# Patient Record
Sex: MALE | Race: WHITE | NOT HISPANIC OR LATINO | Employment: PART TIME | RURAL
[De-identification: names, ages, dates, MRNs, and addresses within clinical notes are randomized per-mention and may not be internally consistent; named-entity substitution may affect disease eponyms.]

---

## 2020-04-22 ENCOUNTER — HISTORICAL (OUTPATIENT)
Dept: ADMINISTRATIVE | Facility: HOSPITAL | Age: 40
End: 2020-04-22

## 2020-04-23 LAB

## 2021-10-01 ENCOUNTER — IMMUNIZATION (OUTPATIENT)
Dept: FAMILY MEDICINE | Facility: CLINIC | Age: 41
End: 2021-10-01

## 2021-10-01 DIAGNOSIS — Z23 NEED FOR VACCINATION: Primary | ICD-10-CM

## 2021-10-01 PROCEDURE — 91301 COVID-19, MRNA, LNP-S, PF, 100 MCG/0.5 ML DOSE VACCINE: ICD-10-PCS | Mod: ,,, | Performed by: NURSE PRACTITIONER

## 2021-10-01 PROCEDURE — 0012A COVID-19, MRNA, LNP-S, PF, 100 MCG/0.5 ML DOSE VACCINE: CPT | Mod: CV19,,, | Performed by: NURSE PRACTITIONER

## 2021-10-01 PROCEDURE — 0012A COVID-19, MRNA, LNP-S, PF, 100 MCG/0.5 ML DOSE VACCINE: ICD-10-PCS | Mod: CV19,,, | Performed by: NURSE PRACTITIONER

## 2021-10-01 PROCEDURE — 91301 COVID-19, MRNA, LNP-S, PF, 100 MCG/0.5 ML DOSE VACCINE: CPT | Mod: ,,, | Performed by: NURSE PRACTITIONER

## 2023-04-07 ENCOUNTER — OFFICE VISIT (OUTPATIENT)
Dept: FAMILY MEDICINE | Facility: CLINIC | Age: 43
End: 2023-04-07

## 2023-04-07 VITALS
OXYGEN SATURATION: 98 % | BODY MASS INDEX: 27.74 KG/M2 | DIASTOLIC BLOOD PRESSURE: 87 MMHG | HEART RATE: 87 BPM | TEMPERATURE: 99 F | SYSTOLIC BLOOD PRESSURE: 126 MMHG | HEIGHT: 68 IN | WEIGHT: 183 LBS | RESPIRATION RATE: 18 BRPM

## 2023-04-07 DIAGNOSIS — N45.3 EPIDIDYMO-ORCHITIS, ACUTE: Primary | ICD-10-CM

## 2023-04-07 DIAGNOSIS — R93.89 ABNORMAL ULTRASOUND: ICD-10-CM

## 2023-04-07 PROBLEM — F32.A DEPRESSIVE DISORDER: Status: ACTIVE | Noted: 2021-08-16

## 2023-04-07 PROBLEM — F41.9 ANXIETY: Status: ACTIVE | Noted: 2021-08-16

## 2023-04-07 PROCEDURE — 87591 CHLAMYDIA/GONORRHOEAE(GC), PCR: ICD-10-PCS | Mod: ,,, | Performed by: CLINICAL MEDICAL LABORATORY

## 2023-04-07 PROCEDURE — 87491 CHLAMYDIA/GONORRHOEAE(GC), PCR: ICD-10-PCS | Mod: ,,, | Performed by: CLINICAL MEDICAL LABORATORY

## 2023-04-07 PROCEDURE — 87591 N.GONORRHOEAE DNA AMP PROB: CPT | Mod: ,,, | Performed by: CLINICAL MEDICAL LABORATORY

## 2023-04-07 PROCEDURE — 87491 CHLMYD TRACH DNA AMP PROBE: CPT | Mod: ,,, | Performed by: CLINICAL MEDICAL LABORATORY

## 2023-04-07 PROCEDURE — 96372 THER/PROPH/DIAG INJ SC/IM: CPT | Mod: ,,, | Performed by: NURSE PRACTITIONER

## 2023-04-07 PROCEDURE — 99203 PR OFFICE/OUTPT VISIT, NEW, LEVL III, 30-44 MIN: ICD-10-PCS | Mod: 25,,, | Performed by: NURSE PRACTITIONER

## 2023-04-07 PROCEDURE — 99203 OFFICE O/P NEW LOW 30 MIN: CPT | Mod: 25,,, | Performed by: NURSE PRACTITIONER

## 2023-04-07 PROCEDURE — 96372 PR INJECTION,THERAP/PROPH/DIAG2ST, IM OR SUBCUT: ICD-10-PCS | Mod: ,,, | Performed by: NURSE PRACTITIONER

## 2023-04-07 RX ORDER — DOXYCYCLINE 100 MG/1
100 CAPSULE ORAL 2 TIMES DAILY
Qty: 20 CAPSULE | Refills: 0 | Status: SHIPPED | OUTPATIENT
Start: 2023-04-07 | End: 2023-04-17

## 2023-04-07 RX ORDER — CEFTRIAXONE 1 G/1
1 INJECTION, POWDER, FOR SOLUTION INTRAMUSCULAR; INTRAVENOUS
Status: COMPLETED | OUTPATIENT
Start: 2023-04-07 | End: 2023-04-07

## 2023-04-07 RX ADMIN — CEFTRIAXONE 1 G: 1 INJECTION, POWDER, FOR SOLUTION INTRAMUSCULAR; INTRAVENOUS at 10:04

## 2023-04-07 NOTE — PATIENT INSTRUCTIONS
Elevate testicles often to help with pain and swelling  Cool moist compress often for pain  May take tylenol or ibuprofen as needed  Reviewed testicular exam monthly on date of birth each month  Rocephin 1 gm IM   Doxycycline 100 mg one pill by mouth twice a day for 10 days  Complete oral antibiotic  Avoid sexual intercourse when taking the prescribed medication and avoid sexual intercourse until both partners have completed medication therapy  Be sure your sex partner has been notified and seeks treatment  It is recommended to be re-screened in 3-6 months to ensure you have not been re-infected/exposed and all infection is clear  Always use protection during sexual activity to avoid exposure to infections/bacteria and to reduce risk of pregnancy if you are female  The health department will be notified if your test is positive for chlamydia, gonorrhea, or certain other sexual transmitted infections

## 2023-04-07 NOTE — PROGRESS NOTES
RONDA Allen    [address] [place] [address]     PATIENT NAME: Antwon Saldana  : 1980  DATE: 23  MRN: [id number]      Billing Provider: RONDA Allen  Level of Service: [place] OFFICE/OUTPT VISIT, NGUYEN TEJADA III, 30-44 MIN      Assessment and Plan (including Health Maintenance)     Health Maintenance Due   Topic Date Due    Hepatitis C Screening  Never done    Lipid Panel  Never done    Pneumococcal Vaccines (Age 0-64) (1 - PCV) Never done    HIV Screening  Never done    TETANUS VACCINE  Never done    Hemoglobin A1c (Diabetic Prevention Screening)  Never done    COVID-19 Vaccine (3 - Booster for Moderna series) 2021    Influenza Vaccine (1) Never done       Problem List Items Addressed This Visit          Renal/    Epididymo-orchitis, acute - Primary    Current Assessment & Plan     Elevate testicles often to help with pain and swelling  Cool moist compress often for pain  May take tylenol or ibuprofen as needed  Reviewed testicular exam monthly on date of birth each month  Rocephin 1 gm IM   Doxycycline 100 mg one pill by mouth twice a day for 10 days  Complete oral antibiotic  Avoid sexual intercourse when taking the prescribed medication and avoid sexual intercourse until both partners have completed medication therapy  Be sure your sex partner has been notified and seeks treatment  It is recommended to be re-screened in 3-6 months to ensure you have not been re-infected/exposed and all infection is clear  Always use protection during sexual activity to avoid exposure to infections/bacteria and to reduce risk of pregnancy if you are female  The health department will be notified if your test is positive for chlamydia, gonorrhea, or certain other sexual transmitted infections             Relevant Medications    cefTRIAXone injection 1 g (Completed)    doxycycline (VIBRAMYCIN) 100 MG Cap    Other Relevant Orders    Chlamydia/GC, PCR     US Scrotum And Testicles       The patient has no Health Maintenance topics of status Not Due    No future appointments.     Follow up in about 3 months (around 7/7/2023), or if symptoms worsen or fail to improve.       Reason for Visit / Chief Complaint:   Groin Swelling (Pt says his right testicle is swollen.  He says swelling has been present for approximately a week.  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 He says there is a knot in front of the testicle)     History of Present Illness / Problem Focused Workflow     Antwon Saldana presents to the clinic with Groin Swelling (Pt says his right testicle is swollen.  He says swelling has been present for approximately a week.  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 He says there is a knot in front of the testicle)     Right testicle swelling with tenderness and aching started 3/29/2023 at end of work day  Denies injury, drainage  Hx: swelling in the past but resolved with oral medications      Review of Systems     Review of Systems   Respiratory: Negative.     Cardiovascular: Negative.    Genitourinary:  Positive for scrotal swelling and testicular pain. Negative for decreased urine volume, discharge, erectile dysfunction, frequency, genital sores, hematuria, penile pain, penile swelling and urgency.      Medical / Social / Family History   History reviewed. No pertinent past medical history.    History reviewed. No pertinent surgical history.    Social History  Antwon Saldana  reports that he has been smoking cigarettes. He started smoking about 26 years ago. He has been smoking an average of 1 pack per day. He has been exposed to tobacco smoke. He has never used smokeless tobacco. He reports current alcohol use. He reports that he does not currently use drugs.    Family History  Antwon Saldana's family history  includes Heart disease in his father.    Medications and Allergies     Medications  No outpatient medications have been marked as taking for the 4/7/23 encounter (Office Visit) with RONDA Pedro.     Current Facility-Administered Medications for the 4/7/23 encounter (Office Visit) with RONDA Pedro   Medication Dose Route Frequency Provider Last Rate Last Admin    [COMPLETED] cefTRIAXone injection 1 g  1 g Intramuscular 1 time in Clinic/HOD RONDA Pedro   1 g at 04/07/23 1008       Allergies  Review of patient's allergies indicates:  No Known Allergies    Physical Examination     Vitals:    04/07/23 0931   BP: 126/87   Pulse: 87   Resp: 18   Temp: 98.8 °F (37.1 °C)     Physical Exam  Exam conducted with a chaperone present.   Constitutional:       Appearance: He is normal weight.   Cardiovascular:      Rate and Rhythm: Normal rate and regular rhythm.      Pulses: Normal pulses.      Heart sounds: Normal heart sounds.   Pulmonary:      Effort: Pulmonary effort is normal.      Breath sounds: Normal breath sounds.   Genitourinary:     Pubic Area: No rash or pubic lice.       Penis: Circumcised. No tenderness, discharge, swelling or lesions.       Testes: Cremasteric reflex is present.         Right: Mass, tenderness and swelling present. Testicular hydrocele or varicocele not present. Right testis is descended. Cremasteric reflex is present.          Left: Mass, tenderness, swelling, testicular hydrocele or varicocele not present. Left testis is descended. Cremasteric reflex is present.       Shayne stage (genital): 5.      Comments: No well defined soft tenderness to epididymis of right      Skin:     General: Skin is warm.      Findings: Erythema present.   Neurological:      Mental Status: He is alert and oriented to person, place, and time.            Signature:  RONDA Allen  [address] [place] [address]  Phone #:  [phone]  Fax #: [phone]       Date of encounter: 4/7/23    Patient Instructions   Elevate testicles often to help with pain and swelling  Cool moist compress often for pain  May take tylenol or ibuprofen as needed  Reviewed testicular exam monthly on date of birth each month  Rocephin 1 gm IM   Doxycycline 100 mg one pill by mouth twice a day for 10 days  Complete oral antibiotic  Avoid sexual intercourse when taking the prescribed medication and avoid sexual intercourse until both partners have completed medication therapy  Be sure your sex partner has been notified and seeks treatment  It is recommended to be re-screened in 3-6 months to ensure you have not been re-infected/exposed and all infection is clear  Always use protection during sexual activity to avoid exposure to infections/bacteria and to reduce risk of pregnancy if you are female  The health department will be notified if your test is positive for chlamydia, gonorrhea, or certain other sexual transmitted infections

## 2023-04-08 LAB
CHLAMYDIA BY PCR: NEGATIVE
N. GONORRHOEAE (GC) BY PCR: NEGATIVE

## 2023-04-11 ENCOUNTER — HOSPITAL ENCOUNTER (OUTPATIENT)
Dept: RADIOLOGY | Facility: HOSPITAL | Age: 43
Discharge: HOME | End: 2023-04-11
Attending: NURSE PRACTITIONER

## 2023-04-11 DIAGNOSIS — N45.3 EPIDIDYMO-ORCHITIS, ACUTE: ICD-10-CM

## 2023-04-11 PROCEDURE — 76870 US EXAM SCROTUM: CPT | Mod: TC

## 2023-04-13 ENCOUNTER — TELEPHONE (OUTPATIENT)
Dept: FAMILY MEDICINE | Facility: CLINIC | Age: 43
End: 2023-04-13

## 2023-04-13 NOTE — TELEPHONE ENCOUNTER
----- Message from Tete Briones sent at 4/13/2023 10:29 AM CDT -----  Please call pt back @ [phone] with last lab results

## 2023-04-13 NOTE — PROGRESS NOTES
Spoke with patient and spouse. Reviewed US results and recommendation for referral to urology due to left epididymal cyst greater than 1 cm. Also reviewed varicocele and reassurance this will be self limiting. Recommend complete oral antibiotics and if pain persists after completion, contact me for second round of oral antibiotics. I also will e-consult for guidance and second opinion recommendations. Both agreed and appreciated the information.

## 2023-04-13 NOTE — TELEPHONE ENCOUNTER
Returned pts call regarding ultrasound results. Stated that Michelle Franco would give him a call to discuss the results personally. I also informed him that she recommended a urology referral and where would he like to go. He stated that [facility] would be okay for whoever you recommended.

## 2023-04-17 ENCOUNTER — TELEPHONE (OUTPATIENT)
Dept: FAMILY MEDICINE | Facility: CLINIC | Age: 43
End: 2023-04-17

## 2023-04-17 RX ORDER — CIPROFLOXACIN 500 MG/1
500 TABLET ORAL EVERY 12 HOURS
Qty: 20 TABLET | Refills: 0 | Status: SHIPPED | OUTPATIENT
Start: 2023-04-17 | End: 2023-04-27

## 2023-04-17 NOTE — TELEPHONE ENCOUNTER
Notified pt of Rx sent in. Verified he was not taking clindamycin or any other antibiotic. He stated he was not taking anything. Stated to call us if he had any more questions or if he does not hear from urology this week. Verbalized understanding.

## 2023-04-17 NOTE — TELEPHONE ENCOUNTER
I have sent to his pharmacy a round of Ciprofloxacin 500 mg one pill by mouth twice a day for 10 days. He should be hearing from urology this week. Be sure he is not taking clindamycin right now. If he is taking this antibiotic do not take ciprofloxacin.  Thank you  Michelle

## 2023-04-17 NOTE — TELEPHONE ENCOUNTER
Contacted pt to see what symptoms he was having. He stated he is  and sore and that you instructed him to let you know if he was still having issues. Please advise.

## 2023-04-17 NOTE — TELEPHONE ENCOUNTER
----- Message from Tete Briones sent at 4/17/2023  8:38 AM CDT -----  Pt is still not feeling well and is needing a refill of his  rdoxycycline (VIBRAMYCIN) 100 MG sent to [facility] in [place]

## 2024-12-12 ENCOUNTER — HOSPITAL ENCOUNTER (EMERGENCY)
Facility: HOSPITAL | Age: 44
Discharge: HOME | End: 2024-12-12

## 2024-12-12 VITALS
WEIGHT: 180 LBS | TEMPERATURE: 98 F | HEART RATE: 89 BPM | DIASTOLIC BLOOD PRESSURE: 98 MMHG | BODY MASS INDEX: 27.28 KG/M2 | SYSTOLIC BLOOD PRESSURE: 138 MMHG | OXYGEN SATURATION: 98 % | HEIGHT: 68 IN | RESPIRATION RATE: 18 BRPM

## 2024-12-12 DIAGNOSIS — T15.92XA FOREIGN BODY, EYE, LEFT, INITIAL ENCOUNTER: Primary | ICD-10-CM

## 2024-12-12 PROCEDURE — 99283 EMERGENCY DEPT VISIT LOW MDM: CPT | Mod: 25

## 2024-12-12 PROCEDURE — 25000003 PHARM REV CODE 250

## 2024-12-12 PROCEDURE — 65222 REMOVE FOREIGN BODY FROM EYE: CPT | Mod: LT

## 2024-12-12 RX ORDER — ERYTHROMYCIN 5 MG/G
OINTMENT OPHTHALMIC
Qty: 20 G | Refills: 0 | Status: SHIPPED | OUTPATIENT
Start: 2024-12-12 | End: 2024-12-12

## 2024-12-12 RX ORDER — ERYTHROMYCIN 5 MG/G
OINTMENT OPHTHALMIC
Status: COMPLETED | OUTPATIENT
Start: 2024-12-12 | End: 2024-12-12

## 2024-12-12 RX ORDER — TETRACAINE HYDROCHLORIDE 5 MG/ML
2 SOLUTION OPHTHALMIC
Status: COMPLETED | OUTPATIENT
Start: 2024-12-12 | End: 2024-12-12

## 2024-12-12 RX ORDER — ERYTHROMYCIN 5 MG/G
OINTMENT OPHTHALMIC
Qty: 20 G | Refills: 0 | Status: SHIPPED | OUTPATIENT
Start: 2024-12-12

## 2024-12-12 RX ADMIN — FLUORESCEIN SODIUM 1 EACH: 1 STRIP OPHTHALMIC at 09:12

## 2024-12-12 RX ADMIN — ERYTHROMYCIN: 5 OINTMENT OPHTHALMIC at 10:12

## 2024-12-12 RX ADMIN — TETRACAINE HYDROCHLORIDE 2 DROP: 5 SOLUTION OPHTHALMIC at 09:12

## 2024-12-13 NOTE — ED PROVIDER NOTES
Encounter Date: 12/12/2024       History     Chief Complaint   Patient presents with    Foreign Body in Eye     left     44-year-old male presents to the emergency department for evaluation of left eye pain after cutting up middle without protective hour.  Reports that he felt a shard of metal getting to left eye and has been having pain to it ever since.  Denies blurred vision, headache, neck pain/stiffness, nausea, vomiting.    The history is provided by the patient. No  was used.   Eye Pain   This is a new problem. The current episode started just prior to arrival. The problem has been unchanged. The left eye is affected. The injury mechanism was a foreign body. The pain is at a severity of 5/10. There is No history of trauma to the eye. There is No known exposure to pink eye. He Does not wear contacts. Associated symptoms include foreign body sensation and eye redness. Pertinent negatives include no numbness, no blurred vision, no discharge, no double vision, no photophobia, no nausea, no tingling, no weakness and no itching. He has tried nothing for the symptoms.     Review of patient's allergies indicates:  No Known Allergies  History reviewed. No pertinent past medical history.  History reviewed. No pertinent surgical history.  Family History   Problem Relation Name Age of Onset    Heart disease Father       Social History     Tobacco Use    Smoking status: Every Day     Current packs/day: 1.00     Average packs/day: 1 pack/day for 27.9 years (27.9 ttl pk-yrs)     Types: Cigarettes     Start date: 1997     Passive exposure: Current    Smokeless tobacco: Never   Substance Use Topics    Alcohol use: Yes     Comment: rarely    Drug use: Not Currently     Review of Systems   Constitutional:  Negative for chills and fever.   Eyes:  Positive for pain and redness. Negative for blurred vision, double vision, photophobia, discharge, itching and visual disturbance.   Respiratory:  Negative for chest  tightness, shortness of breath, wheezing and stridor.    Gastrointestinal:  Negative for nausea.   Musculoskeletal:  Negative for neck pain and neck stiffness.   Skin:  Negative for itching.   Neurological:  Negative for dizziness, tingling, tremors, weakness, light-headedness, numbness and headaches.   Psychiatric/Behavioral:  Negative for confusion.    All other systems reviewed and are negative.      Physical Exam     Initial Vitals [12/12/24 2110]   BP Pulse Resp Temp SpO2   (!) 138/98 89 18 98 °F (36.7 °C) 98 %      MAP       --         Physical Exam    Vitals reviewed.  Constitutional: He appears well-nourished. No distress.   HENT:   Head: Normocephalic.   Eyes: Conjunctivae and EOM are normal. Pupils are equal, round, and reactive to light. Right eye exhibits no discharge. Left eye exhibits no discharge.   Small metal shard noted 2 hours of left eye with no surrounding erythema, drainage noted.   Neck: Neck supple.   Normal range of motion.  Cardiovascular:  Normal rate and regular rhythm.           Pulmonary/Chest: Breath sounds normal. No respiratory distress. He has no wheezes. He has no rhonchi. He exhibits no tenderness.   Abdominal: Abdomen is soft. Bowel sounds are normal. He exhibits no distension. There is no abdominal tenderness. There is no guarding.   Musculoskeletal:         General: No tenderness. Normal range of motion.      Cervical back: Normal range of motion and neck supple.     Lymphadenopathy:     He has no cervical adenopathy.   Neurological: He is alert and oriented to person, place, and time. He has normal strength. No sensory deficit. GCS score is 15. GCS eye subscore is 4. GCS verbal subscore is 5. GCS motor subscore is 6.   Skin: Skin is warm and dry. Capillary refill takes less than 2 seconds.   Psychiatric: He has a normal mood and affect. His behavior is normal.         Medical Screening Exam   See Full Note    ED Course   Foreign Body    Date/Time: 12/12/2024 11:51  PM    Performed by: Lasha Pettit, DO  Authorized by: Thomas Huerta NP  Body area: eye  Location details: left cornea    Anesthesia:  Local Anesthetic: tetracaine drops    Patient sedated: no  Patient restrained: no  Localization method: slit lamp and visualized  Removal mechanism: moist cotton swab and 27-gauge needle  Eye examined with fluorescein.  Fluorescein uptake.  Corneal abrasion size: small  Corneal abrasion location: lateral and central  No residual rust ring present.  Dressing: antibiotic ointment  Depth: superficial  Complexity: simple  1 objects recovered.  Objects recovered: Metal shard  Post-procedure assessment: foreign body removed  Patient tolerance: Patient tolerated the procedure well with no immediate complications      Labs Reviewed - No data to display       Imaging Results    None          Medications   TETRAcaine HCl (PF) 0.5 % Drop 2 drop (2 drops Left Eye Given 12/12/24 2145)   fluorescein ophthalmic strip 1 each (1 each Left Eye Given 12/12/24 2145)   erythromycin 5 mg/gram (0.5 %) ophthalmic ointment ( Left Eye Given 12/12/24 2220)     Medical Decision Making  44-year-old male presents to the emergency department for evaluation of left eye pain after cutting up middle without protective hour.  Reports that he felt a shard of metal getting to left eye and has been having pain to it ever since.  Denies blurred vision, headache, neck pain/stiffness, nausea, vomiting.  Ordered tetracaine eye drops, fluorescein eye strips and reviewed foreign body with Wood's lamp.  Foreign body removal from left eye performed by Dr. Pettit in ED with successful removal, see procedure note.  Erythromycin, tetracaine, fluorescein ordered in ED   Eye patch provided to patient, and follow up and return precautions discussed with patient, who verbalized understanding   Prescriptions provided   Diagnosis: Foreign body of left eye    Risk  Prescription drug management.                                       Clinical Impression:   Final diagnoses:  [T15.92XA] Foreign body, eye, left, initial encounter (Primary)        ED Disposition Condition    Discharge Stable          ED Prescriptions       Medication Sig Dispense Start Date End Date Auth. Provider    erythromycin (ROMYCIN) ophthalmic ointment  (Status: Discontinued) Place a 1/2 inch ribbon of ointment into the lower eyelid. 20 g 12/12/2024 12/12/2024 Thomas Huerta, NP    erythromycin (ROMYCIN) ophthalmic ointment Place a 1/2 inch ribbon of ointment into the lower eyelid BID for 7 days 20 g 12/12/2024 -- Thomas Huerta, NP          Follow-up Information    None          Thomas Huerta, NP  12/12/24 2126

## 2025-06-03 ENCOUNTER — HOSPITAL ENCOUNTER (EMERGENCY)
Facility: HOSPITAL | Age: 45
Discharge: HOME | End: 2025-06-03

## 2025-06-03 VITALS
WEIGHT: 181 LBS | OXYGEN SATURATION: 94 % | DIASTOLIC BLOOD PRESSURE: 103 MMHG | RESPIRATION RATE: 18 BRPM | TEMPERATURE: 98 F | HEART RATE: 47 BPM | BODY MASS INDEX: 27.43 KG/M2 | HEIGHT: 68 IN | SYSTOLIC BLOOD PRESSURE: 145 MMHG

## 2025-06-03 DIAGNOSIS — R00.1 BRADYCARDIA: ICD-10-CM

## 2025-06-03 DIAGNOSIS — N20.1 RIGHT URETERAL STONE: Primary | ICD-10-CM

## 2025-06-03 LAB
ALBUMIN SERPL BCP-MCNC: 4.6 G/DL (ref 3.5–5)
ALBUMIN/GLOB SERPL: 1.3 {RATIO}
ALP SERPL-CCNC: 78 U/L (ref 40–150)
ALT SERPL W P-5'-P-CCNC: 25 U/L
ANION GAP SERPL CALCULATED.3IONS-SCNC: 16 MMOL/L (ref 7–16)
AST SERPL W P-5'-P-CCNC: 25 U/L (ref 11–45)
BACTERIA #/AREA URNS HPF: ABNORMAL /HPF
BASOPHILS # BLD AUTO: 0.07 K/UL (ref 0–0.2)
BASOPHILS NFR BLD AUTO: 0.3 % (ref 0–1)
BILIRUB SERPL-MCNC: 0.9 MG/DL
BILIRUB UR QL STRIP: ABNORMAL
BUN SERPL-MCNC: 14 MG/DL (ref 9–21)
BUN/CREAT SERPL: 11 (ref 6–20)
CALCIUM SERPL-MCNC: 10.1 MG/DL (ref 8.4–10.2)
CHLORIDE SERPL-SCNC: 105 MMOL/L (ref 98–107)
CLARITY UR: CLEAR
CO2 SERPL-SCNC: 27 MMOL/L (ref 22–29)
COLOR UR: YELLOW
CREAT SERPL-MCNC: 1.26 MG/DL (ref 0.72–1.25)
DIFFERENTIAL METHOD BLD: ABNORMAL
EGFR (NO RACE VARIABLE) (RUSH/TITUS): 73 ML/MIN/1.73M2
EOSINOPHIL # BLD AUTO: 0.12 K/UL (ref 0–0.5)
EOSINOPHIL NFR BLD AUTO: 0.5 % (ref 1–4)
ERYTHROCYTE [DISTWIDTH] IN BLOOD BY AUTOMATED COUNT: 14.2 % (ref 11.5–14.5)
GLOBULIN SER-MCNC: 3.6 G/DL (ref 2–4)
GLUCOSE SERPL-MCNC: 188 MG/DL (ref 74–100)
GLUCOSE UR STRIP-MCNC: NEGATIVE MG/DL
HCT VFR BLD AUTO: 51.1 % (ref 40–54)
HGB BLD-MCNC: 17 G/DL (ref 13.5–18)
KETONES UR STRIP-SCNC: ABNORMAL MG/DL
LACTATE SERPL-SCNC: 1.8 MMOL/L (ref 0.5–2.2)
LACTATE SERPL-SCNC: 3.3 MMOL/L (ref 0.5–2.2)
LEUKOCYTE ESTERASE UR QL STRIP: NEGATIVE
LIPASE SERPL-CCNC: 19 U/L
LYMPHOCYTES # BLD AUTO: 4.2 K/UL (ref 1–4.8)
LYMPHOCYTES NFR BLD AUTO: 18.7 % (ref 27–41)
MCH RBC QN AUTO: 29.4 PG (ref 27–31)
MCHC RBC AUTO-ENTMCNC: 33.3 G/DL (ref 32–36)
MCV RBC AUTO: 88.3 FL (ref 80–96)
MONOCYTES # BLD AUTO: 1.45 K/UL (ref 0–0.8)
MONOCYTES NFR BLD AUTO: 6.5 % (ref 2–6)
MPC BLD CALC-MCNC: 10.7 FL (ref 9.4–12.4)
MUCOUS THREADS #/AREA URNS HPF: ABNORMAL /HPF
NEUTROPHILS # BLD AUTO: 16.61 K/UL (ref 1.8–7.7)
NEUTROPHILS NFR BLD AUTO: 74 % (ref 53–65)
NITRITE UR QL STRIP: NEGATIVE
OHS QRS DURATION: 94 MS
OHS QTC CALCULATION: 411 MS
PH UR STRIP: 5.5 PH UNITS
PLATELET # BLD AUTO: 305 K/UL (ref 150–400)
POTASSIUM SERPL-SCNC: 3.9 MMOL/L (ref 3.5–5.1)
PROT SERPL-MCNC: 8.2 G/DL (ref 6.4–8.3)
PROT UR QL STRIP: 100
RBC # BLD AUTO: 5.79 M/UL (ref 4.6–6.2)
RBC # UR STRIP: ABNORMAL /UL
RBC #/AREA URNS HPF: ABNORMAL /HPF
SODIUM SERPL-SCNC: 144 MMOL/L (ref 136–145)
SP GR UR STRIP: >=1.03
SQUAMOUS #/AREA URNS LPF: ABNORMAL /LPF
UROBILINOGEN UR STRIP-ACNC: 1 MG/DL
WBC # BLD AUTO: 22.45 K/UL (ref 4.5–11)
WBC #/AREA URNS HPF: ABNORMAL /HPF

## 2025-06-03 PROCEDURE — 85025 COMPLETE CBC W/AUTO DIFF WBC: CPT | Performed by: NURSE PRACTITIONER

## 2025-06-03 PROCEDURE — 83605 ASSAY OF LACTIC ACID: CPT | Performed by: NURSE PRACTITIONER

## 2025-06-03 PROCEDURE — 81003 URINALYSIS AUTO W/O SCOPE: CPT | Performed by: NURSE PRACTITIONER

## 2025-06-03 PROCEDURE — 99285 EMERGENCY DEPT VISIT HI MDM: CPT | Mod: ,,, | Performed by: NURSE PRACTITIONER

## 2025-06-03 PROCEDURE — 96375 TX/PRO/DX INJ NEW DRUG ADDON: CPT

## 2025-06-03 PROCEDURE — 99285 EMERGENCY DEPT VISIT HI MDM: CPT | Mod: 25

## 2025-06-03 PROCEDURE — 93005 ELECTROCARDIOGRAM TRACING: CPT

## 2025-06-03 PROCEDURE — 63600175 PHARM REV CODE 636 W HCPCS: Performed by: NURSE PRACTITIONER

## 2025-06-03 PROCEDURE — 96365 THER/PROPH/DIAG IV INF INIT: CPT

## 2025-06-03 PROCEDURE — 25000003 PHARM REV CODE 250: Performed by: NURSE PRACTITIONER

## 2025-06-03 PROCEDURE — 96361 HYDRATE IV INFUSION ADD-ON: CPT

## 2025-06-03 PROCEDURE — 83690 ASSAY OF LIPASE: CPT | Performed by: NURSE PRACTITIONER

## 2025-06-03 PROCEDURE — 36415 COLL VENOUS BLD VENIPUNCTURE: CPT | Performed by: NURSE PRACTITIONER

## 2025-06-03 PROCEDURE — 80053 COMPREHEN METABOLIC PANEL: CPT | Performed by: NURSE PRACTITIONER

## 2025-06-03 RX ORDER — KETOROLAC TROMETHAMINE 30 MG/ML
15 INJECTION, SOLUTION INTRAMUSCULAR; INTRAVENOUS
Status: COMPLETED | OUTPATIENT
Start: 2025-06-03 | End: 2025-06-03

## 2025-06-03 RX ORDER — PROMETHAZINE HYDROCHLORIDE 25 MG/1
25 TABLET ORAL EVERY 6 HOURS PRN
Qty: 15 TABLET | Refills: 0 | Status: SHIPPED | OUTPATIENT
Start: 2025-06-03

## 2025-06-03 RX ORDER — TAMSULOSIN HYDROCHLORIDE 0.4 MG/1
0.4 CAPSULE ORAL DAILY
Qty: 10 CAPSULE | Refills: 0 | Status: SHIPPED | OUTPATIENT
Start: 2025-06-03 | End: 2026-06-03

## 2025-06-03 RX ORDER — KETOROLAC TROMETHAMINE 10 MG/1
10 TABLET, FILM COATED ORAL EVERY 6 HOURS
Qty: 20 TABLET | Refills: 0 | Status: SHIPPED | OUTPATIENT
Start: 2025-06-03 | End: 2025-06-08

## 2025-06-03 RX ORDER — LOSARTAN POTASSIUM 25 MG/1
25 TABLET ORAL DAILY
COMMUNITY

## 2025-06-03 RX ORDER — HYDROMORPHONE HYDROCHLORIDE 2 MG/ML
1 INJECTION, SOLUTION INTRAMUSCULAR; INTRAVENOUS; SUBCUTANEOUS
Refills: 0 | Status: COMPLETED | OUTPATIENT
Start: 2025-06-03 | End: 2025-06-03

## 2025-06-03 RX ORDER — HYDROMORPHONE HYDROCHLORIDE 2 MG/1
2 TABLET ORAL EVERY 6 HOURS PRN
Start: 2025-06-03

## 2025-06-03 RX ORDER — HYDROMORPHONE HYDROCHLORIDE 2 MG/ML
1 INJECTION, SOLUTION INTRAMUSCULAR; INTRAVENOUS; SUBCUTANEOUS
Refills: 0 | Status: DISCONTINUED | OUTPATIENT
Start: 2025-06-03 | End: 2025-06-03

## 2025-06-03 RX ADMIN — SODIUM CHLORIDE 1000 ML: 9 INJECTION, SOLUTION INTRAVENOUS at 07:06

## 2025-06-03 RX ADMIN — HYDROMORPHONE HYDROCHLORIDE 1 MG: 2 INJECTION INTRAMUSCULAR; INTRAVENOUS; SUBCUTANEOUS at 07:06

## 2025-06-03 RX ADMIN — PROMETHAZINE HYDROCHLORIDE 25 MG: 25 INJECTION INTRAMUSCULAR; INTRAVENOUS at 07:06

## 2025-06-03 RX ADMIN — KETOROLAC TROMETHAMINE 15 MG: 30 INJECTION, SOLUTION INTRAMUSCULAR at 07:06

## 2025-06-03 NOTE — ED PROVIDER NOTES
Encounter Date: 6/3/2025       History     Chief Complaint   Patient presents with    Abdominal Pain    Flank Pain     Sudden onset of right lower abd. and right back/flank pain x 1 hour.  Awoke patient from his sleep.  Deniies nausea, vomiting or diarrhea.  Denies blood in urine     46 y/o male arrived to the ED via POV with complaint of right flank pain with radiation to right lower quad of abdomen that started suddenly about 1 hour PTA. Pain woke him up from sleep. Describes pain as sharp, rates pain 10/10. Has not taken anything for pain.  Denies hematuria, fever, nausea, vomiting or diarrhea. No past history of kidney stones.    The history is provided by the patient.     Review of patient's allergies indicates:  No Known Allergies  Past Medical History:   Diagnosis Date    Hypertension      History reviewed. No pertinent surgical history.  Family History   Problem Relation Name Age of Onset    Heart disease Father       Social History[1]  Review of Systems   Constitutional:  Positive for diaphoresis. Negative for activity change, appetite change and fever.   Respiratory:  Negative for shortness of breath.    Gastrointestinal:  Positive for abdominal pain. Negative for abdominal distention, anal bleeding, blood in stool, constipation, diarrhea, nausea and vomiting.   Genitourinary:  Positive for difficulty urinating, flank pain and testicular pain (right). Negative for decreased urine volume, dysuria, frequency, hematuria, penile discharge, penile pain, penile swelling, scrotal swelling and urgency. Enuresis: right.  Musculoskeletal:  Negative for back pain and gait problem.   Neurological:  Negative for dizziness, weakness, light-headedness and headaches.   All other systems reviewed and are negative.      Physical Exam     Initial Vitals [06/03/25 1812]   BP Pulse Resp Temp SpO2   (!) 173/98 (!) 47 20 97.4 °F (36.3 °C) 97 %      MAP       --         Physical Exam    Nursing note and vitals  reviewed.  Constitutional: He appears well-developed and well-nourished. He is diaphoretic. He is cooperative.  Non-toxic appearance. He does not have a sickly appearance. He does not appear ill. He appears distressed (appears in pain).   HENT:   Head: Normocephalic and atraumatic.   Cardiovascular:  Regular rhythm, normal heart sounds and intact distal pulses.   Bradycardia present.         Pulmonary/Chest: Effort normal and breath sounds normal.   Abdominal: Abdomen is soft. Bowel sounds are normal. There is abdominal tenderness in the right lower quadrant. No hernia.   No right CVA tenderness.  No left CVA tenderness. There is no rebound, no guarding, no tenderness at McBurney's point and negative Post's sign.     Neurological: He is alert and oriented to person, place, and time.   Skin: Skin is warm and intact. Capillary refill takes less than 2 seconds.   Psychiatric: His speech is normal and behavior is normal. His mood appears anxious (related to pain). Cognition and memory are normal.         Medical Screening Exam   See Full Note    ED Course   Procedures  Labs Reviewed   COMPREHENSIVE METABOLIC PANEL - Abnormal       Result Value    Sodium 144      Potassium 3.9      Chloride 105      CO2 27      Anion Gap 16      Glucose 188 (*)     BUN 14      Creatinine 1.26 (*)     BUN/Creatinine Ratio 11      Calcium 10.1      Total Protein 8.2      Albumin 4.6      Globulin 3.6      A/G Ratio 1.3      Bilirubin, Total 0.9      Alk Phos 78      ALT 25      AST 25      eGFR 73     LACTIC ACID, PLASMA - Abnormal    Lactic Acid 3.3 (*)    URINALYSIS, REFLEX TO URINE CULTURE - Abnormal    Color, UA Yellow      Clarity, UA Clear      pH, UA 5.5      Leukocytes, UA Negative      Nitrites, UA Negative      Protein,  (*)     Glucose, UA Negative      Ketones, UA Trace      Urobilinogen, UA 1.0      Bilirubin, UA Moderate (*)     Blood, UA Large (*)     Specific Gravity, UA >=1.030 (*)    CBC WITH DIFFERENTIAL -  Abnormal    WBC 22.45 (*)     RBC 5.79      Hemoglobin 17.0      Hematocrit 51.1      MCV 88.3      MCH 29.4      MCHC 33.3      RDW 14.2      Platelet Count 305      MPV 10.7      Neutrophils % 74.0 (*)     Lymphocytes % 18.7 (*)     Neutrophils, Abs 16.61 (*)     Lymphocytes, Absolute 4.20      Diff Type Scan Smear      Monocytes % 6.5 (*)     Eosinophils % 0.5 (*)     Basophils % 0.3      Monocytes, Absolute 1.45 (*)     Eosinophils, Absolute 0.12      Basophils, Absolute 0.07     URINALYSIS, MICROSCOPIC - Abnormal    WBC, UA 0-5      RBC, UA Too Numerous To Count (*)     Bacteria, UA Few (*)     Squamous Epithelial Cells, UA Few (*)     Mucus, UA Many (*)    LIPASE - Normal    Lipase 19     LACTIC ACID, PLASMA - Normal    Lactic Acid 1.8     CBC W/ AUTO DIFFERENTIAL    Narrative:     The following orders were created for panel order CBC auto differential.  Procedure                               Abnormality         Status                     ---------                               -----------         ------                     CBC with Differential[[id number]]       Abnormal            Final result                 Please view results for these tests on the individual orders.        ECG Results              EKG 12-lead (In process)        Collection Time Result Time QRS Duration [facility] QTC Calculation    06/03/25 18:24:20 06/03/25 18:43:42 94 411                     In process by Interface, Lab In [facility] (06/03/25 18:43:52)                   Narrative:    Test Reason : R00.1,    Vent. Rate :  45 BPM     Atrial Rate :  45 BPM     P-R Int : 140 ms          QRS Dur :  94 ms      QT Int : 476 ms       P-R-T Axes :  48  67  54 degrees    QTcB Int : 411 ms    Sinus bradycardia  Otherwise normal ECG  No previous ECGs available    Referred By: [email or web address] SELF           Confirmed By:                                   Imaging Results              CT Abdomen Pelvis  Without Contrast (Final result)  Result time 06/03/25  19:23:33      Final result by Ernesto Boogie MD (06/03/25 19:23:33)                   Impression:      1. Mild right hydroureteronephrosis noting right periureteral and perinephric inflammation secondary to a 1 mm calculus within the distal aspect of the right ureter.  Correlation with urinalysis recommended to exclude superimposed infection.  2. Please see above for additional findings.      Electronically signed by: Ernesto Boogie MD  Date:    06/03/2025  Time:    19:23               Narrative:    EXAMINATION:  CT ABDOMEN PELVIS WITHOUT CONTRAST    CLINICAL HISTORY:  Flank pain, kidney stone suspected;    TECHNIQUE:  Low dose axial images, sagittal and coronal reformations were obtained from the lung bases to the pubic symphysis.  Oral contrast was not administered.    COMPARISON:  Ultrasound 04/22/2020    FINDINGS:  Images of the lower thorax are remarkable for bilateral dependent atelectasis.  There is a low attenuating structure along the medial aspect of the right lower lobe, on the 1st slice of the exam.  This may reflect superimposition of a vascular structure although not confirmed.  This region is not included on the reformatted images.  Pericardial cyst is a consideration.    Allowing for motion artifact, the liver, spleen, pancreas, gallbladder and adrenal glands are grossly unremarkable.  The stomach is relatively decompressed without wall thickening noting small hiatal hernia.  No significant abdominal lymphadenopathy.    There is no left hydronephrosis or left nephrolithiasis.  The left ureter is unremarkable without calculi seen.  There is mild right hydroureteronephrosis and right urothelial thickening about the ureter, secondary to a 1 mm calculus within the distal aspect of the right ureter just proximal to the UVJ.  The urinary bladder is decompressed.  The prostate is not enlarged.    The large bowel is grossly unremarkable.  The terminal ileum is unremarkable.  The appendix is  unremarkable.  The small bowel is grossly unremarkable.  There are a few scattered shotty periaortic, pericaval, and mesenteric lymph nodes.  There is atherosclerotic calcification of the aorta and its branches.    No significant inguinal lymphadenopathy.                                       Medications   promethazine (PHENERGAN) 25 mg in 0.9% NaCl 50 mL IVPB (0 mg Intravenous Stopped 6/3/25 1924)   sodium chloride 0.9% bolus 1,000 mL 1,000 mL (0 mLs Intravenous Stopped 6/3/25 2002)   ketorolac injection 15 mg (15 mg Intravenous Given 6/3/25 1905)   HYDROmorphone (PF) injection 1 mg (1 mg Intravenous Given 6/3/25 1948)     Medical Decision Making  46 y/o male arrived to the ED via POV with complaint of right flank pain with radiation to right lower quad of abdomen that started suddenly about 1 hour PTA. Pain woke him up from sleep. Describes pain as sharp, rates pain 10/10. Has not taken anything for pain.  Denies hematuria, fever, nausea, vomiting or diarrhea. No past history of kidney stones.    Nausea and vomiting began during CT scan.    Problems Addressed:  Bradycardia:     Details: EKG: sinus bradycardia with HR 45 bpm. CT scan also, noted revealed a low attenuating structure along the medial aspect of the right lower lobe, on the 1st slice of the exam.  This may reflect superimposition of a vascular structure although not confirmed.  This region is not included on the reformatted images.  Pericardial cyst is a consideration. Discussed cardiology referral, patient declined referral at present.  Patient wishes to wait and follow up with his PCP this week to discuss. Once pain improved. HR is ranging 50-60s.   Right ureteral stone:     Details: CT scan revealed  mild right hydroureteronephrosis and right urothelial thickening about the ureter, secondary to a 1 mm calculus within the distal aspect of the right ureter just proximal to the UVJ. UA negative for infection. WBC elevated most likely related to  pain/stress of vomiting. Afebrile. Lactic down to 1.8 after receiving IV fluids.   -1 liter bolus of NS, Toradol 15 mg and Phenergan 25 mg IV given. Pain down to 5/10.  -Dilaudid 1 mg IV. Pain resolved. Pain 0/10.  RX for Dilaudid 2 mg prescription given to patient, Toradol 10 mg, Phenergan 25 mg and Flomax 0.4 mg sent to pharmacy. Reviewed discharge instructions with patient. Detailed strict return precautions. Patient agreed to treatment plan and verbalized understanding.    Amount and/or Complexity of Data Reviewed  Labs: ordered. Decision-making details documented in ED Course.  Radiology: ordered.  ECG/medicine tests: ordered. Decision-making details documented in ED Course.     Details: EKG: Sinus brittney with HR 45 bpm    Risk  Prescription drug management.                                      Clinical Impression:   Final diagnoses:  [R00.1] Bradycardia  [N20.1] Right ureteral stone (Primary)        ED Disposition Condition    Discharge Stable          ED Prescriptions       Medication Sig Dispense Start Date End Date Auth. Provider    ketorolac (TORADOL) 10 mg tablet Take 1 tablet (10 mg total) by mouth every 6 (six) hours. for 5 days 20 tablet 6/3/2025 6/8/2025 Jaki Briones FNP    promethazine (PHENERGAN) 25 MG tablet Take 1 tablet (25 mg total) by mouth every 6 (six) hours as needed for Nausea. 15 tablet 6/3/2025 -- Jaki Briones FNP    tamsulosin (FLOMAX) 0.4 mg Cap Take 1 capsule (0.4 mg total) by mouth once daily. 10 capsule 6/3/2025 6/3/2026 Jaki Briones FNP    HYDROmorphone (DILAUDID) 2 MG tablet Take 1 tablet (2 mg total) by mouth every 6 (six) hours as needed for Pain (moderate to severe pain). -- 6/3/2025 -- Jaki Briones FNP          Follow-up Information       Follow up With Specialties Details Why Contact Info    Primary Care Provider  Call in 1 day schedule appointment to follow up from your ER visit                      [1]   Social History  Tobacco Use    Smoking  status: Every Day     Current packs/day: 1.00     Average packs/day: 1 pack/day for 28.4 years (28.4 ttl pk-yrs)     Types: Cigarettes     Start date: 1997     Passive exposure: Current    Smokeless tobacco: Never   Vaping Use    Vaping status: Unknown   Substance Use Topics    Alcohol use: Yes     Comment: rarely    Drug use: Yes     Types: Marijuana     Comment: last used today        Jaki Briones, [facility]  06/03/25 1531

## 2025-06-04 NOTE — ED NOTES
O2 sat decreased after pain medication given.   O2 at 2 l per nasal cannula started.   Sats began to improve with oxygen.

## 2025-06-04 NOTE — DISCHARGE INSTRUCTIONS
Ureteral stone:  -Toradol 10 mg one tab by mouth every 6 hours as needed for mild to moderate pain.  Do not take Motrin, Ibuprofen, Aleve or Advil while on Toradol.   -Dilaudid 2 mg one tab by mouth every 6 hours as needed for moderate to severe pain. Take phenergan 25 mg 10 to 15 minutes prior to taking Dilaudid.  -Phenergan 25 mg one tab by mouth every 6 hours as needed for nausea/vomiting.  -Flomax 0.4 mg take one tab by mouth daily to help pass stone  -Increase fluids by mouth    Bradycardia:  -Possible pericardial cyst noted on CT scan. Will need further evaluation.   -Follow up with primary care provider for cardiology referral.

## 2025-06-04 NOTE — ED NOTES
Dc home with spouse.   Verbal and written dc instructions given.    Instructed on use of urine strainer.  Strainer given to patient.   Voiced understanding.   Assisted to vehicle via wheelchair